# Patient Record
Sex: FEMALE | URBAN - METROPOLITAN AREA
[De-identification: names, ages, dates, MRNs, and addresses within clinical notes are randomized per-mention and may not be internally consistent; named-entity substitution may affect disease eponyms.]

---

## 2023-12-20 ENCOUNTER — APPOINTMENT (RX ONLY)
Dept: URBAN - METROPOLITAN AREA CLINIC 77 | Facility: CLINIC | Age: 73
Setting detail: DERMATOLOGY
End: 2023-12-20

## 2023-12-20 DIAGNOSIS — Z71.89 OTHER SPECIFIED COUNSELING: ICD-10-CM

## 2023-12-20 DIAGNOSIS — L85.3 XEROSIS CUTIS: ICD-10-CM

## 2023-12-20 DIAGNOSIS — L82.0 INFLAMED SEBORRHEIC KERATOSIS: ICD-10-CM

## 2023-12-20 PROCEDURE — ? LIQUID NITROGEN

## 2023-12-20 PROCEDURE — 17110 DESTRUCTION B9 LES UP TO 14: CPT

## 2023-12-20 PROCEDURE — 99203 OFFICE O/P NEW LOW 30 MIN: CPT | Mod: 25

## 2023-12-20 PROCEDURE — ? COUNSELING

## 2023-12-20 ASSESSMENT — LOCATION DETAILED DESCRIPTION DERM
LOCATION DETAILED: LEFT FOREHEAD
LOCATION DETAILED: LEFT SUPERIOR TEMPLE
LOCATION DETAILED: LEFT INFERIOR LATERAL FOREHEAD
LOCATION DETAILED: LEFT INFERIOR MEDIAL MALAR CHEEK

## 2023-12-20 ASSESSMENT — LOCATION SIMPLE DESCRIPTION DERM
LOCATION SIMPLE: LEFT TEMPLE
LOCATION SIMPLE: LEFT CHEEK
LOCATION SIMPLE: LEFT FOREHEAD

## 2023-12-20 ASSESSMENT — LOCATION ZONE DERM: LOCATION ZONE: FACE

## 2023-12-20 NOTE — PROCEDURE: LIQUID NITROGEN
Render Note In Bullet Format When Appropriate: No
Show Applicator Variable?: Yes
Number Of Freeze-Thaw Cycles: 2 freeze-thaw cycles
Detail Level: Detailed
Duration Of Freeze Thaw-Cycle (Seconds): 10-15
Medical Necessity Information: It is in your best interest to select a reason for this procedure from the list below. All of these items fulfill various CMS LCD requirements except the new and changing color options.
Post-Care Instructions: I reviewed with the patient in detail post-care instructions. Patient is to wear sunprotection, and avoid picking at any of the treated lesions. Pt may apply Vaseline to crusted or scabbing areas.
Consent: The patient's consent was obtained including but not limited to risks of crusting, scabbing, blistering, scarring, darker or lighter pigmentary change, recurrence, incomplete removal and infection.
Medical Necessity Clause: This procedure was medically necessary because the lesions that were treated were:
Spray Paint Text: The liquid nitrogen was applied to the skin utilizing a spray paint frosting technique.

## 2023-12-20 NOTE — HPI: SKIN LESIONS
From: Gilson Stanford  To: Darya Connolly  Sent: 4/16/2021 9:46 AM CDT  Subject: Note for Dr Nikki Munoz,  I know your appointment with Dr Jordan is coming up. I'm going to send this letter to you that you can print out and give to her staff at the beginning of your appointment, just so we can make sure she has it at the time. Let me know if there's anything else you'd like to add. Also, please have your patch test results printed also (it is in the system scanned in but always helpful to have it in hand).   Gilson Stanford    -------  Dear Dr Hollins,    I appreciate your input in regards to Tammy Cathleen and thank you so much in advance.     Tammy is a 49yo female with history of itchy and scaly rash of the right foot for many years. She had been initially treated by Dr Francisco as psoriasis and tinea with lamisil, halobetasol, otezla (did not tolerate due to nausea on joint swelling). History of fungal culture + for Alternaria (7/2019).    When I first saw her in 9/2020, she had some non specific excoriated plaque with erythematous eczematous plaques on the leg and arm, which I believed to be id reaction to tinea pedis. Thus she was started her on itraconazole 100mg daily for 28 days with halobetasol for the id reaction. She initially felt some relief but flared again shortly. Biopsy was performed spongiotic dermatitis without features of psoriasis (10/2019).     We considered phototherapy but was not pursued due to inconvenience. She was started on dupilumab and low-dose naltrexone (for itching) and had some improvement for several months but started having flares again.   In November 2020, she presented for the first time with what clearly appeared to be allergic contact dermatitis, from adhesives. Previous eruption did NOT look like this, which is why allergic contact dermatitis was not considered initially. Upon further discussion, she did have a patch test that was positive for mercapto mix.     With 
Is This A New Presentation, Or A Follow-Up?: Skin Lesions
How Severe Is Your Skin Lesion?: moderate
worsening dermatitis while on dupilumab, I thought that perhaps her allergic contact dermatitis was now becoming more prominent. We discussed that dupilumab can paradoxically worsen OR improve allergic contact dermatitis in current literature.     She continues to have flares which she reports correlates with her symptoms of hot flashes (and is on low dose clonidine by PCP). She is using bemethasone aug ointment with some relief. Not sure if a more extensive patch test may add value, but will leave that decision up to you. (Patch test at [facility] scanned in dated 2/2019). I had her avoid insoles that are free of mercapto mix: Dr. Alesha Lomax, Thermo-Cushion, Superfeet, [employer], Spenco, Magister.     Of note, she had a generalized drug rash (VA-like) with doxycycline and I'm treating her with spirolactone and finasteride for androgenetic alopecia.     Sincerely,  Ken Stanford    Please do not hesitate to reach and give us a call at [phone]    Ken Stanford MD, PhD   Dermatology  [facility]     
Have Your Skin Lesions Been Treated?: not been treated